# Patient Record
Sex: FEMALE | NOT HISPANIC OR LATINO | Employment: UNEMPLOYED | URBAN - METROPOLITAN AREA
[De-identification: names, ages, dates, MRNs, and addresses within clinical notes are randomized per-mention and may not be internally consistent; named-entity substitution may affect disease eponyms.]

---

## 2024-06-17 ENCOUNTER — OFFICE VISIT (OUTPATIENT)
Dept: PODIATRY | Facility: CLINIC | Age: 44
End: 2024-06-17
Payer: COMMERCIAL

## 2024-06-17 ENCOUNTER — HOSPITAL ENCOUNTER (OUTPATIENT)
Dept: RADIOLOGY | Facility: HOSPITAL | Age: 44
Discharge: HOME | End: 2024-06-17
Attending: PODIATRIST
Payer: COMMERCIAL

## 2024-06-17 VITALS — BODY MASS INDEX: 27.96 KG/M2 | HEIGHT: 64 IN | WEIGHT: 163.81 LBS

## 2024-06-17 DIAGNOSIS — M72.2 PLANTAR FASCIITIS: ICD-10-CM

## 2024-06-17 DIAGNOSIS — M20.11 HALLUX VALGUS OF RIGHT FOOT: ICD-10-CM

## 2024-06-17 DIAGNOSIS — M20.11 HALLUX VALGUS OF RIGHT FOOT: Primary | ICD-10-CM

## 2024-06-17 PROCEDURE — 99203 OFFICE O/P NEW LOW 30 MIN: CPT | Mod: 25,S$GLB,, | Performed by: PODIATRIST

## 2024-06-17 PROCEDURE — 99999 PR PBB SHADOW E&M-NEW PATIENT-LVL III: CPT | Mod: PBBFAC,,, | Performed by: PODIATRIST

## 2024-06-17 PROCEDURE — 1159F MED LIST DOCD IN RCRD: CPT | Mod: CPTII,S$GLB,, | Performed by: PODIATRIST

## 2024-06-17 PROCEDURE — 1160F RVW MEDS BY RX/DR IN RCRD: CPT | Mod: CPTII,S$GLB,, | Performed by: PODIATRIST

## 2024-06-17 PROCEDURE — 73630 X-RAY EXAM OF FOOT: CPT | Mod: TC,50

## 2024-06-17 PROCEDURE — 3008F BODY MASS INDEX DOCD: CPT | Mod: CPTII,S$GLB,, | Performed by: PODIATRIST

## 2024-06-17 RX ORDER — OXCARBAZEPINE 150 MG/1
TABLET, FILM COATED ORAL
COMMUNITY
Start: 2018-06-17

## 2024-06-17 RX ORDER — RISPERIDONE 1 MG/1
TABLET ORAL
COMMUNITY
Start: 2018-06-17

## 2024-06-17 NOTE — PROGRESS NOTES
"Subjective:     Patient ID: Lily Nieves is a 43 y.o. female.    Chief Complaint: Foot Pain (C/o bunion pain, right, x 1 year, rates pain 2/10, tenderness on the left heel, x 4 days, rates pain 4/10, 0 injury, non-diabetic, wears sandals, last seen PCP Dr. العلي on 05/23/24)    Lily is a 43 y.o. female who presents to the podiatry clinic  with complaint of  right (bunion) and left (heel) foot pain. Onset of the symptoms was several months ago. Precipitating event: none known. Current symptoms include: ability to bear weight, but with some pain. Aggravating factors: walking. Symptoms have been intermittent. Patients rates pain  2/10(bunion) and 4/10(heel)  on pain scale. Patient states bunion is painful in certain shoes and left heel when walking barefoot. Patient also complains of 2nd toenails. Patient has no other pedal complaints at this time.     Patient Active Problem List   Diagnosis   (none) - all problems resolved or deleted       Medication List with Changes/Refills   Current Medications    RISPERIDONE (RISPERDAL) 1 MG TABLET        TRILEPTAL 150 MG TAB           Review of patient's allergies indicates:  No Known Allergies    History reviewed. No pertinent surgical history.    Family History   Problem Relation Name Age of Onset    Hypertension Father      Heart disease Father      Hypertension Mother      Diabetes Mother      Colon cancer Maternal Aunt      Heart disease Brother      Ovarian cancer Neg Hx      Breast cancer Neg Hx      Thrombophilia Neg Hx         Social History     Socioeconomic History    Marital status:    Tobacco Use    Smoking status: Never    Smokeless tobacco: Never   Substance and Sexual Activity    Alcohol use: No    Drug use: No    Sexual activity: Yes     Partners: Male     Birth control/protection: None     Comment: mut monog       Vitals:    06/17/24 0922   Weight: 74.3 kg (163 lb 12.8 oz)   Height: 5' 4" (1.626 m)   PainSc:   4       Review of Systems "   Constitutional:  Negative for chills and fever.   Respiratory:  Negative for shortness of breath.    Cardiovascular:  Negative for chest pain, palpitations, orthopnea, claudication and leg swelling.   Gastrointestinal:  Negative for diarrhea, nausea and vomiting.   Musculoskeletal:  Negative for joint pain (right 1st MPJ).   Skin:  Negative for rash.   Neurological:  Negative for dizziness, tingling, sensory change, focal weakness and weakness.   Psychiatric/Behavioral: Negative.           Objective:      PHYSICAL EXAM: Apperance: Alert and orient in no distress,well developed, and with good attention to grooming and body habits  Patient presents ambulating in sandals.   Lower Extremity Exam  VASCULAR: Dorsalis pedis pulses 2/4 bilateral and Posterior Tibial pulses 2/4 bilateral.   DERMATOLOGICAL: No skin rashes, subcutaneous nodules, lesions, or ulcers observed bilateral.   NEUROLOGICAL: Light touch, sharp-dull, proprioception all present and equal bilaterally.    MUSCULOSKELETAL: Muscle strength 5/5 for all foot inverters, everters, plantarflexors, and dorsiflexors bilateral. Ankle joints bilateral shows decreased ROM. bilateral ankle ROM shows greater decrease in dorsiflexion with knee extended. Ankle joint ROM is pain free and without crepitus bilateral. Pain to palpation bilateral plantar medial tubercle. Plantar medial aspect of bilateral heels shows tenderness to palpation. No pain on medial-lateral compression of the calcaneus. Mild bunions noted bilateral. No pain on palpation of right medial 1st MPJ.         Assessment:       ICD-10-CM ICD-9-CM   1. Hallux valgus of right foot  M20.11 735.0   2. Plantar fasciitis  M72.2 728.71       Plan:   Hallux valgus of right foot    Plantar fasciitis    I counseled the patient on her conditions, regarding findings of my examination, my impressions, and usual treatment plan.   Ordered bilateral foot x-rays.   I explained to the patient that etiology and treatment  options for heel pain including rest,  ice messages, stretching exercises, strappings/tappings, NSAID's, injections, new shoegear with orthotic inserts, and/or surgical treatment.   Patient agreed to insert and stretching therapy.   I gave written and verbal instructions on heel cord stretching and this was demonstrated for the patient. Patient expressed understanding.  Patient instructed on adequate icing techniques. Patient should ice the affected area at least once per day x 10 minutes for 10 days . I advised the  patient that extra icing would also be beneficial to ensure adequate anti inflammatory effect.   The patient and I reviewed the types of shoes she should be wearing, my recommendation includes generally the best time of the day for a shoe fitting is the afternoon, shoes with a wide toe box, very good cushion, and tennis shoes with removable inner soles. The patient and I reviewed my recommendations for over-the-counter orthotic inserts.   Patient to return in 4-6 weeks of sooner if needed.          Comfort Fernando DPM  [facility]